# Patient Record
Sex: MALE | Race: ASIAN | Employment: FULL TIME | URBAN - METROPOLITAN AREA
[De-identification: names, ages, dates, MRNs, and addresses within clinical notes are randomized per-mention and may not be internally consistent; named-entity substitution may affect disease eponyms.]

---

## 2018-03-16 ENCOUNTER — HOSPITAL ENCOUNTER (INPATIENT)
Facility: HOSPITAL | Age: 48
LOS: 2 days | Discharge: HOME | DRG: 392 | End: 2018-03-18
Attending: EMERGENCY MEDICINE | Admitting: STUDENT IN AN ORGANIZED HEALTH CARE EDUCATION/TRAINING PROGRAM
Payer: COMMERCIAL

## 2018-03-16 ENCOUNTER — APPOINTMENT (OUTPATIENT)
Dept: CT IMAGING | Age: 48
DRG: 392 | End: 2018-03-16
Attending: EMERGENCY MEDICINE
Payer: COMMERCIAL

## 2018-03-16 DIAGNOSIS — R10.84 ABDOMINAL PAIN, GENERALIZED: Primary | ICD-10-CM

## 2018-03-16 DIAGNOSIS — F10.229: ICD-10-CM

## 2018-03-16 DIAGNOSIS — K70.30 ALCOHOLIC CIRRHOSIS OF LIVER WITHOUT ASCITES (HCC): ICD-10-CM

## 2018-03-16 LAB
ALBUMIN SERPL-MCNC: 4.4 G/DL (ref 3.5–4.8)
ALP LIVER SERPL-CCNC: 43 U/L (ref 45–117)
ALT SERPL-CCNC: 31 U/L (ref 17–63)
AMMONIA: 36 UMOL/L (ref 11–32)
AST SERPL-CCNC: 28 U/L (ref 15–41)
BASOPHILS # BLD AUTO: 0.08 X10(3) UL (ref 0–0.1)
BASOPHILS NFR BLD AUTO: 0.8 %
BILIRUB SERPL-MCNC: 0.6 MG/DL (ref 0.1–2)
BILIRUB UR QL STRIP.AUTO: NEGATIVE
BUN BLD-MCNC: 17 MG/DL (ref 8–20)
CALCIUM BLD-MCNC: 8.6 MG/DL (ref 8.3–10.3)
CHLORIDE: 104 MMOL/L (ref 101–111)
CLARITY UR REFRACT.AUTO: CLEAR
CO2: 26 MMOL/L (ref 22–32)
COLOR UR AUTO: YELLOW
CREAT BLD-MCNC: 0.96 MG/DL (ref 0.7–1.3)
EOSINOPHIL # BLD AUTO: 0.13 X10(3) UL (ref 0–0.3)
EOSINOPHIL NFR BLD AUTO: 1.4 %
ERYTHROCYTE [DISTWIDTH] IN BLOOD BY AUTOMATED COUNT: 13.7 % (ref 11.5–16)
ETHYL ALCOHOL: 334 MG/DL (ref ?–3)
GLUCOSE BLD-MCNC: 77 MG/DL (ref 70–99)
GLUCOSE UR STRIP.AUTO-MCNC: NEGATIVE MG/DL
HCT VFR BLD AUTO: 44 % (ref 37–53)
HGB BLD-MCNC: 14.3 G/DL (ref 13–17)
IMMATURE GRANULOCYTE COUNT: 0.02 X10(3) UL (ref 0–1)
IMMATURE GRANULOCYTE RATIO %: 0.2 %
INR BLD: 0.96 (ref 0.89–1.12)
KETONES UR STRIP.AUTO-MCNC: NEGATIVE MG/DL
LEUKOCYTE ESTERASE UR QL STRIP.AUTO: NEGATIVE
LIPASE: 254 U/L (ref 73–393)
LYMPHOCYTES # BLD AUTO: 2.44 X10(3) UL (ref 0.9–4)
LYMPHOCYTES NFR BLD AUTO: 25.4 %
M PROTEIN MFR SERPL ELPH: 8.5 G/DL (ref 6.1–8.3)
MCH RBC QN AUTO: 24.5 PG (ref 27–33.2)
MCHC RBC AUTO-ENTMCNC: 32.5 G/DL (ref 31–37)
MCV RBC AUTO: 75.5 FL (ref 80–99)
MONOCYTES # BLD AUTO: 0.99 X10(3) UL (ref 0.1–1)
MONOCYTES NFR BLD AUTO: 10.3 %
NEUTROPHIL ABS PRELIM: 5.95 X10 (3) UL (ref 1.3–6.7)
NEUTROPHILS # BLD AUTO: 5.95 X10(3) UL (ref 1.3–6.7)
NEUTROPHILS NFR BLD AUTO: 61.9 %
NITRITE UR QL STRIP.AUTO: NEGATIVE
PH UR STRIP.AUTO: 5 [PH] (ref 4.5–8)
PLATELET # BLD AUTO: 220 10(3)UL (ref 150–450)
POTASSIUM SERPL-SCNC: 3.7 MMOL/L (ref 3.6–5.1)
PSA SERPL DL<=0.01 NG/ML-MCNC: 12.5 SECONDS (ref 11.8–14.1)
RBC # BLD AUTO: 5.83 X10(6)UL (ref 4.3–5.7)
RBC UR QL AUTO: NEGATIVE
RED CELL DISTRIBUTION WIDTH-SD: 36.6 FL (ref 35.1–46.3)
SODIUM SERPL-SCNC: 139 MMOL/L (ref 136–144)
SP GR UR STRIP.AUTO: <=1.005 (ref 1–1.03)
UROBILINOGEN UR STRIP.AUTO-MCNC: 0.2 MG/DL
WBC # BLD AUTO: 9.6 X10(3) UL (ref 4–13)

## 2018-03-16 PROCEDURE — 99223 1ST HOSP IP/OBS HIGH 75: CPT | Performed by: HOSPITALIST

## 2018-03-16 PROCEDURE — 74177 CT ABD & PELVIS W/CONTRAST: CPT | Performed by: EMERGENCY MEDICINE

## 2018-03-16 RX ORDER — DEXTROSE AND SODIUM CHLORIDE 5; .45 G/100ML; G/100ML
INJECTION, SOLUTION INTRAVENOUS CONTINUOUS
Status: DISCONTINUED | OUTPATIENT
Start: 2018-03-16 | End: 2018-03-17

## 2018-03-16 RX ORDER — ONDANSETRON 2 MG/ML
4 INJECTION INTRAMUSCULAR; INTRAVENOUS
Status: DISCONTINUED | OUTPATIENT
Start: 2018-03-16 | End: 2018-03-17

## 2018-03-16 RX ORDER — THIAMINE HYDROCHLORIDE 100 MG/ML
100 INJECTION, SOLUTION INTRAMUSCULAR; INTRAVENOUS ONCE
Status: COMPLETED | OUTPATIENT
Start: 2018-03-16 | End: 2018-03-16

## 2018-03-16 RX ORDER — SODIUM CHLORIDE 9 MG/ML
125 INJECTION, SOLUTION INTRAVENOUS CONTINUOUS
Status: DISCONTINUED | OUTPATIENT
Start: 2018-03-16 | End: 2018-03-17

## 2018-03-17 PROBLEM — F10.920 ALCOHOLIC INTOXICATION WITHOUT COMPLICATION (HCC): Status: ACTIVE | Noted: 2018-03-16

## 2018-03-17 LAB
ALBUMIN SERPL-MCNC: 3.7 G/DL (ref 3.5–4.8)
ALP LIVER SERPL-CCNC: 39 U/L (ref 45–117)
ALT SERPL-CCNC: 23 U/L (ref 17–63)
AST SERPL-CCNC: 25 U/L (ref 15–41)
BASOPHILS # BLD AUTO: 0.04 X10(3) UL (ref 0–0.1)
BASOPHILS NFR BLD AUTO: 0.7 %
BILIRUB SERPL-MCNC: 0.8 MG/DL (ref 0.1–2)
BUN BLD-MCNC: 14 MG/DL (ref 8–20)
CALCIUM BLD-MCNC: 8.1 MG/DL (ref 8.3–10.3)
CHLORIDE: 107 MMOL/L (ref 101–111)
CO2: 26 MMOL/L (ref 22–32)
CREAT BLD-MCNC: 0.81 MG/DL (ref 0.7–1.3)
EOSINOPHIL # BLD AUTO: 0.21 X10(3) UL (ref 0–0.3)
EOSINOPHIL NFR BLD AUTO: 3.9 %
ERYTHROCYTE [DISTWIDTH] IN BLOOD BY AUTOMATED COUNT: 12.9 % (ref 11.5–16)
GLUCOSE BLD-MCNC: 97 MG/DL (ref 70–99)
HAV IGM SER QL: 2 MG/DL (ref 1.7–3)
HCT VFR BLD AUTO: 39.4 % (ref 37–53)
HGB BLD-MCNC: 12.8 G/DL (ref 13–17)
IMMATURE GRANULOCYTE COUNT: 0.01 X10(3) UL (ref 0–1)
IMMATURE GRANULOCYTE RATIO %: 0.2 %
IMMUNOGLOBULIN A: 184 MG/DL (ref 70–312)
LYMPHOCYTES # BLD AUTO: 1.73 X10(3) UL (ref 0.9–4)
LYMPHOCYTES NFR BLD AUTO: 32.3 %
M PROTEIN MFR SERPL ELPH: 7.2 G/DL (ref 6.1–8.3)
MCH RBC QN AUTO: 24.4 PG (ref 27–33.2)
MCHC RBC AUTO-ENTMCNC: 32.5 G/DL (ref 31–37)
MCV RBC AUTO: 75.2 FL (ref 80–99)
MONOCYTES # BLD AUTO: 0.6 X10(3) UL (ref 0.1–1)
MONOCYTES NFR BLD AUTO: 11.2 %
NEUTROPHIL ABS PRELIM: 2.76 X10 (3) UL (ref 1.3–6.7)
NEUTROPHILS # BLD AUTO: 2.76 X10(3) UL (ref 1.3–6.7)
NEUTROPHILS NFR BLD AUTO: 51.7 %
PLATELET # BLD AUTO: 175 10(3)UL (ref 150–450)
POTASSIUM SERPL-SCNC: 3.9 MMOL/L (ref 3.6–5.1)
POTASSIUM SERPL-SCNC: 3.9 MMOL/L (ref 3.6–5.1)
RBC # BLD AUTO: 5.24 X10(6)UL (ref 4.3–5.7)
RED CELL DISTRIBUTION WIDTH-SD: 34.7 FL (ref 35.1–46.3)
SODIUM SERPL-SCNC: 135 MMOL/L (ref 136–144)
WBC # BLD AUTO: 5.4 X10(3) UL (ref 4–13)

## 2018-03-17 PROCEDURE — 99232 SBSQ HOSP IP/OBS MODERATE 35: CPT | Performed by: HOSPITALIST

## 2018-03-17 PROCEDURE — 99253 IP/OBS CNSLTJ NEW/EST LOW 45: CPT | Performed by: SURGERY

## 2018-03-17 RX ORDER — LORAZEPAM 2 MG/ML
1 INJECTION INTRAMUSCULAR
Status: DISCONTINUED | OUTPATIENT
Start: 2018-03-17 | End: 2018-03-18

## 2018-03-17 RX ORDER — POTASSIUM CHLORIDE 14.9 MG/ML
20 INJECTION INTRAVENOUS ONCE
Status: COMPLETED | OUTPATIENT
Start: 2018-03-17 | End: 2018-03-17

## 2018-03-17 RX ORDER — LORAZEPAM 2 MG/ML
2 INJECTION INTRAMUSCULAR
Status: DISCONTINUED | OUTPATIENT
Start: 2018-03-17 | End: 2018-03-18

## 2018-03-17 RX ORDER — ACETAMINOPHEN 325 MG/1
650 TABLET ORAL EVERY 6 HOURS PRN
Status: DISCONTINUED | OUTPATIENT
Start: 2018-03-17 | End: 2018-03-18

## 2018-03-17 RX ORDER — ONDANSETRON 2 MG/ML
4 INJECTION INTRAMUSCULAR; INTRAVENOUS EVERY 6 HOURS PRN
Status: DISCONTINUED | OUTPATIENT
Start: 2018-03-17 | End: 2018-03-18

## 2018-03-17 RX ORDER — SODIUM CHLORIDE 9 MG/ML
INJECTION, SOLUTION INTRAVENOUS CONTINUOUS
Status: DISCONTINUED | OUTPATIENT
Start: 2018-03-17 | End: 2018-03-18

## 2018-03-17 RX ORDER — PANTOPRAZOLE SODIUM 40 MG/1
40 TABLET, DELAYED RELEASE ORAL
Qty: 120 TABLET | Refills: 0 | Status: SHIPPED | OUTPATIENT
Start: 2018-03-17 | End: 2018-05-16

## 2018-03-17 RX ORDER — PANTOPRAZOLE SODIUM 40 MG/1
40 TABLET, DELAYED RELEASE ORAL
Status: DISCONTINUED | OUTPATIENT
Start: 2018-03-17 | End: 2018-03-18

## 2018-03-17 RX ORDER — LORAZEPAM 1 MG/1
2 TABLET ORAL
Status: DISCONTINUED | OUTPATIENT
Start: 2018-03-17 | End: 2018-03-18

## 2018-03-17 RX ORDER — LORAZEPAM 1 MG/1
1 TABLET ORAL
Status: DISCONTINUED | OUTPATIENT
Start: 2018-03-17 | End: 2018-03-18

## 2018-03-17 NOTE — PROGRESS NOTES
[facility]  Progress Note     Mayra Moe Be Patient Status:  Inpatient    1970 MRN [id number]   [facility] 3NE-A Attending Guadalupe Rhodes MD   Hosp Day # 1 PCP No primary care provider on file.      Chief Complaint: abd pain    S: P Epic.    Medications:   • multivitamin/thiamine/folic acid infusion   Intravenous Q24H       ASSESSMENT / PLAN:     1. Abdominal pain and early satiety - enteritis on ct   1. Surgery   2. GI   2. EtOH Abuse /dependence   3.  EtOH intoxication     Plan of ca

## 2018-03-17 NOTE — CONSULTS
[facility]                       [facility]enter[facility] Consultation[facility]    [address] [facility] Patient Status:  Inpatient    1970 MRN [id number]   [facility] 3NE-A Attending Ferdinand Zee MD   [facility] Day # 1 100 mg Intravenous Once   [COMPLETED] iohexol (OMNIPAQUE) 350 MG/ML injection 100 mL 100 mL Intravenous ONCE PRN   [COMPLETED] Pantoprazole Sodium (PROTONIX) 40 mg in Sodium Chloride 0.9 % 10 mL IV push 40 mg Intravenous Once     Allergies: No Known Allerg rigidity;  No lymphadenopathy  CV: Regular rate and rhythm, with normal S1 and S2  Resp: Clear to auscultation bilaterally without wheezes; rubs, rhonchi, or rales  Abdomen: Soft, non-tender, non-distended with the presence of bowel sounds; no rebound or gu BID  2. Trial of CLD  3. Recommend push enteroscopy in the close outpatient setting if he can abstain from [facility]; he currently is at risk for withdrawal and thus endoscopy would be high risk at this time  4. TTG IgA, IgA  5. He has been seen by surgery  6.

## 2018-03-17 NOTE — PROGRESS NOTES
Received patient at 0730  A/O x 4  NSR on tele  NPO   Abdomen slightly distended, but soft  Patient denies any pain  CIWA = 0  Infuvite bag infusing  Will continue to monitor

## 2018-03-17 NOTE — PROGRESS NOTES
AM         Related encounter: Assessment from 3/17/2018 in [facility]          []Manual[]Template  []Copied  Level of Care Assessment Note     General Questions  Why are you here?: \"I am here because of abdominal bloating started 2 months ag Hallucination Type: No problems reported or observed  Delusions: No problems reported or observed  Depression Symptoms: No problems reported or observed  Anxiety Symptoms: No problems reported or observed.   Bipolar Symptoms: No problems reported or observe Have You Ever Been Harmed by a Partner/Caregiver?: No  Health Concerns r/t Abuse: No     General Appearance  Characteristics: Appropriate clothing  Eye Contact: Direct  Psychomotor Behavior  Gait/Movement: Normal  Abnormal movements: None  Posture:  Other (

## 2018-03-17 NOTE — H&P
[facility]  History and Physical     Sovira Be Patient Status:  Emergency    1970 MRN [id number]   Location [address] [facility] Attending Manuel Avila MD   Hosp Day # 0 PCP No primary care provider on file.      Annalisa No acute distress. Alert and oriented x 3. HEENT: Normocephalic atraumatic. Moist mucous membranes. Conjunctiva injected  Respiratory: Clear to auscultation bilaterally. No wheezes. No rhonchi. Cardiovascular: S1, S2. Regular rate and rhythm.  No murmurs

## 2018-03-17 NOTE — PROGRESS NOTES
Patient's nurse Silvino Miranda RN, updated that patient does not want any kind of help or treatment for alcohol dependence.

## 2018-03-17 NOTE — PLAN OF CARE
GASTROINTESTINAL - ADULT    • Minimal or absence of nausea and vomiting Progressing    • Maintains adequate nutritional intake (undernourished) Progressing    • Achieves appropriate nutritional intake (bariatric) Progressing        HEMATOLOGIC - ADULT    •

## 2018-03-17 NOTE — ED PROVIDER NOTES
Patient Seen in: [address] [facility] [place]    History   Patient presents with:  Abdomen/Flank Pain (GI/)    Stated Complaint: abd pain-bloated x 2 months. no vomiting. HPI    Patient complains of abdominal bloating.   Abdominal bloati (5' 10\")   Wt 77.1 kg   SpO2 99%   BMI 24.39 kg/m²         Physical Exam  General: The patient is awake, alert, conversant. Patient answers questions quickly and appropriately.   Eyes: sclera white, conjunctiva pink and moist.  Lids and lashes are normal. PLATELET.   Procedure                               Abnormality         Status                     ---------                               -----------         ------                     CBC W/ DIFFERENTIAL[[id number]]          Abnormal            Final resul drinking a sixpack and doing shots prior to coming to the emergency department. Patient is significantly symptomatic and not been able to eat or drink much. 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   GI consultation and endoscopy could be

## 2018-03-17 NOTE — ED INITIAL ASSESSMENT (HPI)
c/o abd distension and bloating x 2 mths, feels full after eating. Unable to tolerate oral food. Loose bowels. States he drinks daily alcohol 6 pack a day.

## 2018-03-17 NOTE — CONSULTS
[facility]  Report of Consultation    Selam Senmela Conner Patient Status:  Inpatient    1970 MRN [id number]   [facility] 3NE-A Attending Pradip Pacheco MD   Hosp Day # 1 PCP No primary care provider on file.      Reason for Consultation:  Ab bruising  Integumentary:  Negative for pruritus and rash  Musculoskeletal:  Negative for bone/joint symptoms  Neurological:  Negative for gait disturbance  Psychiatric:  Negative for inappropriate interaction and psychiatric symptoms  Respiratory:  Negativ 0.81 03/17/2018   GLU 97 03/17/2018   CA 8.1 03/17/2018   ALKPHO 39 03/17/2018   ALT 23 03/17/2018   AST 25 03/17/2018   BILT 0.8 03/17/2018   ALB 3.7 03/17/2018   TP 7.2 03/17/2018       CT ABDOMEN+PELVIS (CONTRAST ONLY)   I personally reviewed the report benefit from an upper endoscopy. 3. The patient has no acute surgical issues. [address] [facility] for diet from general surgery standpoint. 5. I will follow this patient peripherally. I spent 30 minutes face to face with the patient.  More than 50% of that time was

## 2018-03-18 VITALS
HEIGHT: 70 IN | OXYGEN SATURATION: 93 % | WEIGHT: 170 LBS | SYSTOLIC BLOOD PRESSURE: 152 MMHG | HEART RATE: 53 BPM | RESPIRATION RATE: 16 BRPM | DIASTOLIC BLOOD PRESSURE: 92 MMHG | TEMPERATURE: 98 F | BODY MASS INDEX: 24.34 KG/M2

## 2018-03-18 LAB
BASOPHILS # BLD AUTO: 0.04 X10(3) UL (ref 0–0.1)
BASOPHILS NFR BLD AUTO: 0.7 %
BUN BLD-MCNC: 11 MG/DL (ref 8–20)
CALCIUM BLD-MCNC: 8.4 MG/DL (ref 8.3–10.3)
CHLORIDE: 101 MMOL/L (ref 101–111)
CO2: 26 MMOL/L (ref 22–32)
CREAT BLD-MCNC: 0.8 MG/DL (ref 0.7–1.3)
EOSINOPHIL # BLD AUTO: 0.21 X10(3) UL (ref 0–0.3)
EOSINOPHIL NFR BLD AUTO: 3.7 %
ERYTHROCYTE [DISTWIDTH] IN BLOOD BY AUTOMATED COUNT: 12.7 % (ref 11.5–16)
GLUCOSE BLD-MCNC: 104 MG/DL (ref 70–99)
HCT VFR BLD AUTO: 44.1 % (ref 37–53)
HGB BLD-MCNC: 14.5 G/DL (ref 13–17)
IMMATURE GRANULOCYTE COUNT: 0.01 X10(3) UL (ref 0–1)
IMMATURE GRANULOCYTE RATIO %: 0.2 %
LYMPHOCYTES # BLD AUTO: 1.45 X10(3) UL (ref 0.9–4)
LYMPHOCYTES NFR BLD AUTO: 25.3 %
MCH RBC QN AUTO: 24.9 PG (ref 27–33.2)
MCHC RBC AUTO-ENTMCNC: 32.9 G/DL (ref 31–37)
MCV RBC AUTO: 75.8 FL (ref 80–99)
MONOCYTES # BLD AUTO: 0.74 X10(3) UL (ref 0.1–1)
MONOCYTES NFR BLD AUTO: 12.9 %
NEUTROPHIL ABS PRELIM: 3.28 X10 (3) UL (ref 1.3–6.7)
NEUTROPHILS # BLD AUTO: 3.28 X10(3) UL (ref 1.3–6.7)
NEUTROPHILS NFR BLD AUTO: 57.2 %
PLATELET # BLD AUTO: 187 10(3)UL (ref 150–450)
POTASSIUM SERPL-SCNC: 3.6 MMOL/L (ref 3.6–5.1)
RBC # BLD AUTO: 5.82 X10(6)UL (ref 4.3–5.7)
RED CELL DISTRIBUTION WIDTH-SD: 34.1 FL (ref 35.1–46.3)
SODIUM SERPL-SCNC: 136 MMOL/L (ref 136–144)
WBC # BLD AUTO: 5.7 X10(3) UL (ref 4–13)

## 2018-03-18 PROCEDURE — 99239 HOSP IP/OBS DSCHRG MGMT >30: CPT | Performed by: HOSPITALIST

## 2018-03-18 RX ORDER — AMLODIPINE BESYLATE 5 MG/1
5 TABLET ORAL DAILY
Status: DISCONTINUED | OUTPATIENT
Start: 2018-03-18 | End: 2018-03-18

## 2018-03-18 RX ORDER — HYDRALAZINE HYDROCHLORIDE 20 MG/ML
10 INJECTION INTRAMUSCULAR; INTRAVENOUS ONCE
Status: COMPLETED | OUTPATIENT
Start: 2018-03-18 | End: 2018-03-18

## 2018-03-18 RX ORDER — AMLODIPINE BESYLATE 5 MG/1
5 TABLET ORAL DAILY
Qty: 30 TABLET | Refills: 0 | Status: ON HOLD | OUTPATIENT
Start: 2018-03-19 | End: 2018-12-26

## 2018-03-18 NOTE — PROGRESS NOTES
Gastroenterology Progress Note  Patient Name: Erinn Conner  Chief Complaint: bloating  S: Pt reports that bloating is unchanged. He started to have diarrhea today. He does want to try an eat more. He denies abdominal pain.    O: /92 (BP Location: Left ar soft  3. Recommend push enteroscopy on Tuesday once he is out of withdrawal window; currently is hypertensive. Could also consider as outpatient (if he is able to abstain from Etoh)  4. TTG IgA, IgA pending  5. He has been seen by surgery  6.  Recommend Eto

## 2018-03-18 NOTE — DISCHARGE SUMMARY
[facility]  DISCHARGE SUMMARY     Kiran Conner Patient Status:  Inpatient    1970 MRN [id number]   [facility] 3NE-A Attending Anibal Cruz MD   Hosp Day # 2 PCP No primary care provider on file.      Date of Admission: 3/16/201 palpation and activities such as bending forward to tie his shoe.      Patient has had withdrawal symptoms in the past but no withdrawal seizures. Brief Synopsis: patient was admitted with CT abdomen results as below.  He was seen by general surgery who reviewed: reviewed     Follow-up appointment:   Philippe Clarke, [address] [facility] [address] [facility]  [phone]    Call on 3/19/2018      A primary care doctor    Schedule an appointment as soon as possible for a visit        Vital signs:  Temp

## 2018-03-18 NOTE — PROGRESS NOTES
[facility]  Progress Note     Heron Lama Ubaldo Patient Status:  Inpatient    1970 MRN [id number]   [facility] 3NE-A Attending Phuc Shankar MD   Hosp Day # 2 PCP No primary care provider on file.      Chief Complaint: abd pain    S: S 72 hours. Imaging: Imaging data reviewed in Epic. Medications:   • AmLODIPine Besylate  5 mg Oral Daily   • multivitamin/thiamine/folic acid infusion   Intravenous Q24H   • Pantoprazole Sodium  40 mg Oral BID AC       ASSESSMENT / PLAN:     1.  A

## 2018-03-18 NOTE — PLAN OF CARE
Pt discharged home with family. Prescriptions and discharge instructions given with pt verbalizing understanding. He agrees with plan for outpatient GI scope and abstain from alcohol until follow up with Dr. Roper Friend.

## 2018-03-19 NOTE — PAYOR COMM NOTE
--------------  ADMISSION REVIEW     Payor: [address]  Subscriber #:  [id number]  Authorization Number: N/A    Admit date: 3/16/18  Admit time: 2319       Admitting Physician: Nina De Leon MD  Attending Physician:  No att. providers found SpO2 99%   BMI 24.39 kg/m²[SS.2]   Physical Exam  General: The patient is awake, alert, conversant. Patient answers questions quickly and appropriately.   Eyes: sclera white, conjunctiva pink and moist.  Lids and lashes are normal.  Nose: Unremarkable with Abnormality         Status                     ---------                               -----------         ------                     CBC W/ DIFFERENTIAL[[id number]]          Abnormal            Final result                 Ple that he have significant withdrawal symptoms. [facility] Inocente Toro. She will admit and arrange GI consultation.   I requested medical detox bed as patient has not had seizures, hallucinations, or other life-threatening sy Physical Exam:    /68   Pulse 100   Temp (!) 97.5 °F (36.4 °C) (Temporal)   Resp 20   Ht 5' 10\" (1.778 m)   Wt 170 lb (77.1 kg)   SpO2 99%   BMI 24.39 kg/m²    General: No acute distress. Alert and oriented x 3.    HEENT: Normocephalic atraumat Johnnie Buckley, JOSE      Pantoprazole Sodium (PROTONIX) EC tab 40 mg     Date Action Dose Route User    Discharged on 3/18/2018    3/18/2018 1637 Given 40 mg Oral [place]        3/17            [facility]  Report of Consultation    rash  Musculoskeletal:  Negative for bone/joint symptoms  Neurological:  Negative for gait disturbance  Psychiatric:  Negative for inappropriate interaction and psychiatric symptoms  Respiratory:  Negative for cough, dyspnea and wheezing        Physical Ex 03/17/2018   ALKPHO 39 03/17/2018   ALT 23 03/17/2018   AST 25 03/17/2018   BILT 0.8 03/17/2018   ALB 3.7 03/17/2018   TP 7.2 03/17/2018         CT ABDOMEN+PELVIS (CONTRAST ONLY)   I personally reviewed the reports.     FINDINGS:    LUNG BASE:  Clear.   ARMIDA diet from general surgery standpoint.   5. I will follow this patient peripherally.  [facility]                       Gastroenterology [address] [facility] Gastroenterology           [address] [facility] Patient Status:  [place] Ordaz mEq 20 mEq Intravenous Once   [COMPLETED] Thiamine HCl injection 100 mg 100 mg Intravenous Once   [COMPLETED] iohexol (OMNIPAQUE) 350 MG/ML injection 100 mL 100 mL Intravenous ONCE PRN   [COMPLETED] Pantoprazole Sodium (PROTONIX) 40 mg in Sodium Chloride 0 [employer] and [address] [place] [address] St will be handled by a member of the Care Management Team.  For all other patients, please follow usual protocol for discharge care transition.  [place] Discharge Diagnoses: Bloating   Discharge Diagnosis:   Dyspepsia and bloating  There is some midline splaying of the rectus sheath with mild eventration of small bowel            Vital signs:  Temp:  [97.8 °F (36.6 °C)-98.4 °F (36.9 °C)] 97.8 °F (36.6 °C)  Pulse:  [53-68] 53  Resp:  [16-20] 16  BP: (152-165)/() 152/92     Phys

## 2018-03-21 NOTE — PAYOR COMM NOTE
--------------  CONTINUED STAY REVIEW    Payor: [facility]  Subscriber #:  [id number]  Authorization Number: [id number]    Admit date: 3/16/18  Admit time: 2319    Admitting Physician: Mare Dangelo MD  Attending Physician:  No att. provide enteroscopy in the close outpatient setting. d thus endoscopy would be high risk at this time  4. TTG IgA, IgA  5.  He has been seen by surgery  Levi Park DO  3:16 PM  3/17/2018  [facility]  [phone]     MEDICATIONS ADMINISTERED I

## 2018-03-21 NOTE — PAYOR COMM NOTE
--------------  ADMISSION REVIEW     Payor: [address] [address]  Subscriber #:  [id number]  Authorization Number: [id number]    Admit date: 3/16/18  Admit time: 2319       Admitting Physician: Maru Jacobo MD  Attending Physician:  No att. provider % [03/16/18 1905]  O2 Device: None (Room air) [03/16/18 1905]    Current:/75   Pulse 80   Temp (!) 97.5 °F (36.4 °C) (Temporal)   Resp 18   Ht 177.8 cm (5' 10\")   Wt 77.1 kg   SpO2 99%   BMI 24.39 kg/m²      Physical Exam  General: The patient is aw is likely causing some restriction of his breathing. I doubt bacterial peritonitis. Pancreatitis must be excluded. Patient treated with thiamine and closely monitored.   He was treated with Protonix    CBC shows normal white count, hemoglobin, and plat Leigha Cabello MD on 3/16/2018 10:08 PM    H&P signed by Sagar Gonzalez MD at 3/17/2018  3:32 AM     Author:  Sagar Gonzalez MD Service:  (none) Author Type:  Physician    Filed:  3/17/2018  3:32 AM Date of Service:  3/16/2018  9:56 PM Status:  Noe Mora Soft, diffusely, BS (+), no rebound, guarding or organomegaly. Musculoskeletal: Moves all extremities. Extremities: No edema or cyanosis. Integument: No rashes or lesions. Psychiatric: Appropriate mood and affect.     Diagnostic Data:      Labs:  Evelena Flattery

## 2018-03-22 LAB
TISSUE TRANSGLUTAMINASE AB,IGA: 1.4 U/ML (ref ?–15)
TISSUE TRANSGLUTAMINASE IGA QUALITATIVE: NEGATIVE

## 2018-03-30 ENCOUNTER — ANESTHESIA EVENT (OUTPATIENT)
Dept: ENDOSCOPY | Facility: HOSPITAL | Age: 48
End: 2018-03-30

## 2018-03-30 ENCOUNTER — ANESTHESIA (OUTPATIENT)
Dept: ENDOSCOPY | Facility: HOSPITAL | Age: 48
End: 2018-03-30

## 2018-03-30 ENCOUNTER — HOSPITAL ENCOUNTER (OUTPATIENT)
Facility: HOSPITAL | Age: 48
Setting detail: HOSPITAL OUTPATIENT SURGERY
Discharge: HOME | End: 2018-03-30
Attending: INTERNAL MEDICINE | Admitting: INTERNAL MEDICINE
Payer: COMMERCIAL

## 2018-03-30 ENCOUNTER — SURGERY (OUTPATIENT)
Age: 48
End: 2018-03-30

## 2018-03-30 VITALS
SYSTOLIC BLOOD PRESSURE: 170 MMHG | HEART RATE: 49 BPM | HEIGHT: 70 IN | RESPIRATION RATE: 20 BRPM | OXYGEN SATURATION: 100 % | BODY MASS INDEX: 26.05 KG/M2 | TEMPERATURE: 99 F | WEIGHT: 182 LBS | DIASTOLIC BLOOD PRESSURE: 82 MMHG

## 2018-03-30 DIAGNOSIS — R68.81 EARLY SATIETY: ICD-10-CM

## 2018-03-30 DIAGNOSIS — R93.5 ABNORMAL CT OF THE ABDOMEN: ICD-10-CM

## 2018-03-30 DIAGNOSIS — R14.0 ABDOMINAL BLOATING: ICD-10-CM

## 2018-03-30 PROCEDURE — 88305 TISSUE EXAM BY PATHOLOGIST: CPT | Performed by: INTERNAL MEDICINE

## 2018-03-30 PROCEDURE — 0DB68ZX EXCISION OF STOMACH, VIA NATURAL OR ARTIFICIAL OPENING ENDOSCOPIC, DIAGNOSTIC: ICD-10-PCS | Performed by: INTERNAL MEDICINE

## 2018-03-30 RX ORDER — METOCLOPRAMIDE HYDROCHLORIDE 5 MG/ML
10 INJECTION INTRAMUSCULAR; INTRAVENOUS AS NEEDED
Status: DISCONTINUED | OUTPATIENT
Start: 2018-03-30 | End: 2018-03-30

## 2018-03-30 RX ORDER — MORPHINE SULFATE 4 MG/ML
2 INJECTION, SOLUTION INTRAMUSCULAR; INTRAVENOUS EVERY 5 MIN PRN
Status: DISCONTINUED | OUTPATIENT
Start: 2018-03-30 | End: 2018-03-30

## 2018-03-30 RX ORDER — ONDANSETRON 2 MG/ML
4 INJECTION INTRAMUSCULAR; INTRAVENOUS AS NEEDED
Status: DISCONTINUED | OUTPATIENT
Start: 2018-03-30 | End: 2018-03-30

## 2018-03-30 RX ORDER — SODIUM CHLORIDE, SODIUM LACTATE, POTASSIUM CHLORIDE, CALCIUM CHLORIDE 600; 310; 30; 20 MG/100ML; MG/100ML; MG/100ML; MG/100ML
INJECTION, SOLUTION INTRAVENOUS CONTINUOUS
Status: DISCONTINUED | OUTPATIENT
Start: 2018-03-30 | End: 2018-03-30

## 2018-03-30 RX ORDER — NALOXONE HYDROCHLORIDE 0.4 MG/ML
80 INJECTION, SOLUTION INTRAMUSCULAR; INTRAVENOUS; SUBCUTANEOUS AS NEEDED
Status: DISCONTINUED | OUTPATIENT
Start: 2018-03-30 | End: 2018-03-30

## 2018-03-30 RX ORDER — DEXAMETHASONE SODIUM PHOSPHATE 4 MG/ML
4 VIAL (ML) INJECTION AS NEEDED
Status: DISCONTINUED | OUTPATIENT
Start: 2018-03-30 | End: 2018-03-30

## 2018-03-30 NOTE — H&P (VIEW-ONLY)
[facility]                       [facility]    [address] [facility] Patient Status:  Inpatient    1970 MRN [id number]   [facility] 3NE-A Attending Maru Jacobo MD   [facility] Day # 1 100 mg Intravenous Once   [COMPLETED] iohexol (OMNIPAQUE) 350 MG/ML injection 100 mL 100 mL Intravenous ONCE PRN   [COMPLETED] Pantoprazole Sodium (PROTONIX) 40 mg in Sodium Chloride 0.9 % 10 mL IV push 40 mg Intravenous Once     Allergies: No Known Allerg rigidity;  No lymphadenopathy  CV: Regular rate and rhythm, with normal S1 and S2  Resp: Clear to auscultation bilaterally without wheezes; rubs, rhonchi, or rales  Abdomen: Soft, non-tender, non-distended with the presence of bowel sounds; no rebound or gu BID  2. Trial of CLD  3. Recommend push enteroscopy in the close outpatient setting if he can abstain from [facility]; he currently is at risk for withdrawal and thus endoscopy would be high risk at this time  4. TTG IgA, IgA  5. He has been seen by surgery  6.

## 2018-03-30 NOTE — ANESTHESIA POSTPROCEDURE EVALUATION
[address] [facility] Patient Status:  Hospital Outpatient Surgery   Age/Gender 41year old male MRN [id number]   Location [address] [facility]. [facility] Day # 0 PCP No primary care provider on file.        Lizzy

## 2018-03-30 NOTE — ANESTHESIA PREPROCEDURE EVALUATION
PRE-OP EVALUATION    Patient Name: Ronaldo Conner    Pre-op Diagnosis: Early satiety [R68.81]  Abdominal bloating [R14.0]  Abnormal CT of the abdomen [R93.5]    Procedure(s):  ENTEROSCOPY      Surgeon(s) and Role:     * Dioni Parikh, DO - Primary  (H) 03/18/2018   CA 8.4 03/18/2018       Lab Results  Component Value Date   INR 0.96 03/16/2018         Airway      Mallampati: II  Mouth opening: >3 FB  TM distance: > 6 cm  Neck ROM: full Cardiovascular      Rhythm: regular  Rate: normal     D

## 2018-03-30 NOTE — INTERVAL H&P NOTE
Pre-op Diagnosis: Early satiety [R68.81]  Abdominal bloating [R14.0]  Abnormal CT of the abdomen [R93.5]    The above referenced H&P was reviewed by Azucena Yoder DO on 3/30/2018, the patient was examined and no significant changes have occurred in the

## 2018-03-30 NOTE — OPERATIVE REPORT
Leta Conner Patient Status:  Hospital Outpatient Surgery    1970 MRN [id number]   [facility] ENDOSCOPY Attending John Silva,    Hosp Day # 0 PCP No primary care provider on file.        PREOPERATIVE DIAGNOSIS/INDIC 1. Follow up pathology results   2. Consider capsule endoscopy to further evaluate remaining small bowel. 3. Follow up with Paddy Landers NP in GI office.      Leona Benjamin  Gastroenterology/Advanced Endoscopy  [facility]

## 2018-12-26 ENCOUNTER — APPOINTMENT (OUTPATIENT)
Dept: CT IMAGING | Facility: HOSPITAL | Age: 48
End: 2018-12-26
Attending: EMERGENCY MEDICINE
Payer: COMMERCIAL

## 2018-12-26 PROBLEM — F10.20 ALCOHOL DEPENDENCE (HCC): Status: ACTIVE | Noted: 2018-12-26

## 2018-12-26 PROCEDURE — 74177 CT ABD & PELVIS W/CONTRAST: CPT | Performed by: EMERGENCY MEDICINE

## 2018-12-26 NOTE — ED INITIAL ASSESSMENT (HPI)
Pt reports upper abd pain. Hx alcoholism. Last drink at 1000 today. Hx gastric ulcer. Denies nausea or vomiting. Reports extreme bloating.

## 2018-12-26 NOTE — ED PROVIDER NOTES
Patient Seen in: [facility] Emergency Department    History   Patient presents with:  Abdomen/Flank Pain (GI/)  Alcohol Intoxication (neurologic)    Stated Complaint: etoh, abd pain    HPI    46-year-old male who has a history of alcohol abuse alt (Room air)       Current:BP (!) 146/91   Pulse 73   Temp 98.4 °F (36.9 °C) (Temporal)   Resp 18   Ht 177.8 cm (5' 10\")   Wt 77.1 kg   SpO2 99%   BMI 24.39 kg/m²         Physical Exam    HEENT : NCAT, EOMI, PEERL, throat clear, neck supple, no JVD, trachea GOLD          Medications   0.9%  NaCl infusion ( Intravenous New Bag 12/26/18 1243)   HYDROmorphone HCl (DILAUDID) 1 MG/ML injection 1 mg (1 mg Intravenous Given 12/26/18 1242)   LORazepam (ATIVAN) injection 1 mg (1 mg Intravenous Given 12/26/18 1242)   o dissection. RETROPERITONEUM:  No mass or adenopathy. BOWEL/MESENTERY:  No visible mass, obstruction, or bowel wall thickening. No free intraperitoneal air, fluid or inflammatory changes. Normal appendix. ABDOMINAL WALL:  No mass or hernia.   URINARY BLAD

## 2018-12-27 NOTE — BH LEVEL OF CARE ASSESSMENT
Level of Care Assessment Note    General Questions  Why are you here?: Pt states \"I can't stop drinkign and I am getting sick, I need help\" Pt denies any SI/HI   Precipitating Events: Pt presented to [facility] ER for evaluation of abdominal pain and seeking al experience of thoughts of dying by suicide: Frightening  Protective Factors: Pt states \"I want to live a good life\"   Past Suicidal Ideation: Denies  Family History or Personal Lived Experience of Loss or Near Loss by Suicide: Denies    Danger to [employer] Current/Previous MH/CD Providers  Hospitalizations, Placements, Therapy, Detox: No                          Current/Previous MH/CD Treatment  Recovery Support Groups: Denies Past History  History of Seclusion/Restraint: No    Alcohol Use  How ofte something to you that makes you feel unsafe?: No  Have You Ever Been Harmed by a Partner/Caregiver?: No  Health Concerns r/t Abuse: No  Possible Abuse Reportable to[de-identified] Not appropriate for reporting to authorities    General Appearance  Characteristics: Appr relapsed on drinking d/t stressors. Pt states he moved to the area from [place] in the beginning of this year and was alex thru a divorce at the time.  Pt was admitted to [facility] in CTU from 3/6/18-3/18/18 for tx of alcohol related withdrawals and complicat

## 2018-12-27 NOTE — CM/SW NOTE
Cab voucher provided to get patient to [facility] for inpatient detox.
